# Patient Record
Sex: FEMALE | Race: ASIAN | NOT HISPANIC OR LATINO | URBAN - METROPOLITAN AREA
[De-identification: names, ages, dates, MRNs, and addresses within clinical notes are randomized per-mention and may not be internally consistent; named-entity substitution may affect disease eponyms.]

---

## 2017-12-18 ENCOUNTER — EMERGENCY (EMERGENCY)
Facility: HOSPITAL | Age: 37
LOS: 1 days | Discharge: HOME | End: 2017-12-18
Attending: EMERGENCY MEDICINE | Admitting: EMERGENCY MEDICINE
Payer: MEDICAID

## 2017-12-18 VITALS
OXYGEN SATURATION: 100 % | HEART RATE: 76 BPM | TEMPERATURE: 99 F | RESPIRATION RATE: 18 BRPM | SYSTOLIC BLOOD PRESSURE: 121 MMHG | DIASTOLIC BLOOD PRESSURE: 76 MMHG

## 2017-12-18 VITALS
HEART RATE: 88 BPM | SYSTOLIC BLOOD PRESSURE: 122 MMHG | RESPIRATION RATE: 18 BRPM | TEMPERATURE: 98 F | OXYGEN SATURATION: 99 % | DIASTOLIC BLOOD PRESSURE: 80 MMHG

## 2017-12-18 LAB
ALBUMIN SERPL ELPH-MCNC: 4.4 G/DL — SIGNIFICANT CHANGE UP (ref 3.3–5)
ALP SERPL-CCNC: 87 U/L — SIGNIFICANT CHANGE UP (ref 40–120)
ALT FLD-CCNC: 26 U/L RC — SIGNIFICANT CHANGE UP (ref 10–45)
ANION GAP SERPL CALC-SCNC: 14 MMOL/L — SIGNIFICANT CHANGE UP (ref 5–17)
APPEARANCE UR: ABNORMAL
APTT BLD: 35.3 SEC — SIGNIFICANT CHANGE UP (ref 27.5–37.4)
AST SERPL-CCNC: 18 U/L — SIGNIFICANT CHANGE UP (ref 10–40)
BACTERIA # UR AUTO: ABNORMAL /HPF
BASOPHILS # BLD AUTO: 0.1 K/UL — SIGNIFICANT CHANGE UP (ref 0–0.2)
BASOPHILS NFR BLD AUTO: 0.9 % — SIGNIFICANT CHANGE UP (ref 0–2)
BILIRUB SERPL-MCNC: 0.2 MG/DL — SIGNIFICANT CHANGE UP (ref 0.2–1.2)
BILIRUB UR-MCNC: NEGATIVE — SIGNIFICANT CHANGE UP
BLD GP AB SCN SERPL QL: NEGATIVE — SIGNIFICANT CHANGE UP
BUN SERPL-MCNC: 8 MG/DL — SIGNIFICANT CHANGE UP (ref 7–23)
CALCIUM SERPL-MCNC: 9 MG/DL — SIGNIFICANT CHANGE UP (ref 8.4–10.5)
CHLORIDE SERPL-SCNC: 103 MMOL/L — SIGNIFICANT CHANGE UP (ref 96–108)
CO2 SERPL-SCNC: 22 MMOL/L — SIGNIFICANT CHANGE UP (ref 22–31)
COLOR SPEC: YELLOW — SIGNIFICANT CHANGE UP
COMMENT - URINE: SIGNIFICANT CHANGE UP
CREAT SERPL-MCNC: 0.45 MG/DL — LOW (ref 0.5–1.3)
DIFF PNL FLD: ABNORMAL
EOSINOPHIL # BLD AUTO: 0.1 K/UL — SIGNIFICANT CHANGE UP (ref 0–0.5)
EOSINOPHIL NFR BLD AUTO: 0.8 % — SIGNIFICANT CHANGE UP (ref 0–6)
EPI CELLS # UR: SIGNIFICANT CHANGE UP /HPF
GLUCOSE SERPL-MCNC: 76 MG/DL — SIGNIFICANT CHANGE UP (ref 70–99)
GLUCOSE UR QL: NEGATIVE — SIGNIFICANT CHANGE UP
HCG SERPL-ACNC: 6041 MIU/ML — HIGH (ref 5–24)
HCT VFR BLD CALC: 43.3 % — SIGNIFICANT CHANGE UP (ref 34.5–45)
HGB BLD-MCNC: 14.7 G/DL — SIGNIFICANT CHANGE UP (ref 11.5–15.5)
INR BLD: 1.01 RATIO — SIGNIFICANT CHANGE UP (ref 0.88–1.16)
KETONES UR-MCNC: NEGATIVE — SIGNIFICANT CHANGE UP
LEUKOCYTE ESTERASE UR-ACNC: ABNORMAL
LYMPHOCYTES # BLD AUTO: 3.6 K/UL — HIGH (ref 1–3.3)
LYMPHOCYTES # BLD AUTO: 40.5 % — SIGNIFICANT CHANGE UP (ref 13–44)
MCHC RBC-ENTMCNC: 31.6 PG — SIGNIFICANT CHANGE UP (ref 27–34)
MCHC RBC-ENTMCNC: 34 GM/DL — SIGNIFICANT CHANGE UP (ref 32–36)
MCV RBC AUTO: 92.9 FL — SIGNIFICANT CHANGE UP (ref 80–100)
MONOCYTES # BLD AUTO: 0.5 K/UL — SIGNIFICANT CHANGE UP (ref 0–0.9)
MONOCYTES NFR BLD AUTO: 6.1 % — SIGNIFICANT CHANGE UP (ref 2–14)
NEUTROPHILS # BLD AUTO: 4.5 K/UL — SIGNIFICANT CHANGE UP (ref 1.8–7.4)
NEUTROPHILS NFR BLD AUTO: 51.7 % — SIGNIFICANT CHANGE UP (ref 43–77)
NITRITE UR-MCNC: NEGATIVE — SIGNIFICANT CHANGE UP
PH UR: 6.5 — SIGNIFICANT CHANGE UP (ref 5–8)
PLATELET # BLD AUTO: 246 K/UL — SIGNIFICANT CHANGE UP (ref 150–400)
POTASSIUM SERPL-MCNC: 4.1 MMOL/L — SIGNIFICANT CHANGE UP (ref 3.5–5.3)
POTASSIUM SERPL-SCNC: 4.1 MMOL/L — SIGNIFICANT CHANGE UP (ref 3.5–5.3)
PROT SERPL-MCNC: 8.1 G/DL — SIGNIFICANT CHANGE UP (ref 6–8.3)
PROT UR-MCNC: 30 MG/DL
PROTHROM AB SERPL-ACNC: 11 SEC — SIGNIFICANT CHANGE UP (ref 9.8–12.7)
RBC # BLD: 4.66 M/UL — SIGNIFICANT CHANGE UP (ref 3.8–5.2)
RBC # FLD: 11.5 % — SIGNIFICANT CHANGE UP (ref 10.3–14.5)
RBC CASTS # UR COMP ASSIST: >50 /HPF (ref 0–2)
RH IG SCN BLD-IMP: POSITIVE — SIGNIFICANT CHANGE UP
SODIUM SERPL-SCNC: 139 MMOL/L — SIGNIFICANT CHANGE UP (ref 135–145)
SP GR SPEC: 1.02 — SIGNIFICANT CHANGE UP (ref 1.01–1.02)
UROBILINOGEN FLD QL: NEGATIVE — SIGNIFICANT CHANGE UP
WBC # BLD: 8.8 K/UL — SIGNIFICANT CHANGE UP (ref 3.8–10.5)
WBC # FLD AUTO: 8.8 K/UL — SIGNIFICANT CHANGE UP (ref 3.8–10.5)
WBC UR QL: SIGNIFICANT CHANGE UP /HPF (ref 0–5)

## 2017-12-18 PROCEDURE — 86901 BLOOD TYPING SEROLOGIC RH(D): CPT

## 2017-12-18 PROCEDURE — 80053 COMPREHEN METABOLIC PANEL: CPT

## 2017-12-18 PROCEDURE — 76817 TRANSVAGINAL US OBSTETRIC: CPT

## 2017-12-18 PROCEDURE — 81001 URINALYSIS AUTO W/SCOPE: CPT

## 2017-12-18 PROCEDURE — 85027 COMPLETE CBC AUTOMATED: CPT

## 2017-12-18 PROCEDURE — 85610 PROTHROMBIN TIME: CPT

## 2017-12-18 PROCEDURE — 87086 URINE CULTURE/COLONY COUNT: CPT

## 2017-12-18 PROCEDURE — 99285 EMERGENCY DEPT VISIT HI MDM: CPT

## 2017-12-18 PROCEDURE — 85730 THROMBOPLASTIN TIME PARTIAL: CPT

## 2017-12-18 PROCEDURE — 99284 EMERGENCY DEPT VISIT MOD MDM: CPT | Mod: 25

## 2017-12-18 PROCEDURE — 86850 RBC ANTIBODY SCREEN: CPT

## 2017-12-18 PROCEDURE — 76817 TRANSVAGINAL US OBSTETRIC: CPT | Mod: 26

## 2017-12-18 PROCEDURE — 84702 CHORIONIC GONADOTROPIN TEST: CPT

## 2017-12-18 PROCEDURE — 86900 BLOOD TYPING SEROLOGIC ABO: CPT

## 2017-12-18 NOTE — ED PROVIDER NOTE - PLAN OF CARE
Follow-up with your OBGYN within 48 hours for repeat evaluation. Return to an ER for severe bleeding, abdominal pain, fainting or any other concerns.

## 2017-12-18 NOTE — ED PROVIDER NOTE - OBJECTIVE STATEMENT
37 y.o. female  at approx 7 weeks by dates, LMP oct 27, pw lower ab cramping and vaginal spotting x3 days, today slightly worse, 1 pad change today. Pain has subsided at this time. Pt has not had US or appointment with OB for this pregnancy yet. On prenatal vitamin but no other meds. No complications with 1st pregnancy.

## 2017-12-18 NOTE — ED PROVIDER NOTE - CARE PLAN
Principal Discharge DX:	Vaginal bleeding in pregnancy, first trimester  Instructions for follow-up, activity and diet:	Follow-up with your OBGYN within 48 hours for repeat evaluation. Return to an ER for severe bleeding, abdominal pain, fainting or any other concerns.

## 2017-12-18 NOTE — ED ADULT NURSE NOTE - OBJECTIVE STATEMENT
38 y/o female a+ox3, denies pmhx, , ambulatory from home c/o vaginal bleeding. Pt reports constant vaginal bleeding starting this morning; blood is bright red with "small clots", Pt states she has intermittent LLQ cramping, non-radiaitng, currently denies pain. Pt confirms she is "7 weeks pregnant" LMP 18, denies diagnosis of high risk pregnancy, states prior pregnancy was normal. Pt denies visual changes, headache, feeling lightheaded, dizziness, chest pain or discomfort, difficulty breathing, N/V/D, fever or chills. VS documented, IV established, labs drawn. Pt left in position of comfort, will reassess.

## 2017-12-18 NOTE — ED PROVIDER NOTE - ATTENDING CONTRIBUTION TO CARE
attending Julio: 37yF  at approx 7 weeks by LMP (oct 27) p/w lower abdominal cramping and vaginal spotting x3 days. No confirmed IUP. No prior miscarriage. On exam, VSS, well-appearing, pink conjunctiva, abdomen soft/NT. Will obtain labs including serum hcg, type and screen, urinalysis, TVUS eval for IUP and reassess.

## 2017-12-18 NOTE — ED PROVIDER NOTE - NS ED ROS FT
ROS: denies HA, weakness, dizziness, fevers/chills, nausea/vomiting, chest pain, SOB, diaphoresis, back/neck pain, dysuria/hematuria, or rash  +ab pain, vaginal spotting

## 2017-12-18 NOTE — ED ADULT NURSE REASSESSMENT NOTE - NS ED NURSE REASSESS COMMENT FT1
Patient ate egg sandwich and tolerated well.  Patient denies nausea.
Patient received from JERE Sanchez, denies nausea and pain at this time, requests something to eat.  Awaiting US.

## 2017-12-18 NOTE — ED PROVIDER NOTE - MEDICAL DECISION MAKING DETAILS
37 y.o. female  at 7 weeks by dates pw ab cramping and vaginal spotting x 3 days. Well appearing. Will check labs, US, reevaluate.

## 2017-12-19 ENCOUNTER — APPOINTMENT (OUTPATIENT)
Dept: OBGYN | Facility: CLINIC | Age: 37
End: 2017-12-19
Payer: MEDICAID

## 2017-12-19 ENCOUNTER — OUTPATIENT (OUTPATIENT)
Dept: OUTPATIENT SERVICES | Facility: HOSPITAL | Age: 37
LOS: 1 days | End: 2017-12-19
Payer: MEDICAID

## 2017-12-19 VITALS — BODY MASS INDEX: 29.64 KG/M2 | WEIGHT: 170 LBS | DIASTOLIC BLOOD PRESSURE: 70 MMHG | SYSTOLIC BLOOD PRESSURE: 112 MMHG

## 2017-12-19 DIAGNOSIS — N76.0 ACUTE VAGINITIS: ICD-10-CM

## 2017-12-19 DIAGNOSIS — N91.2 AMENORRHEA, UNSPECIFIED: ICD-10-CM

## 2017-12-19 DIAGNOSIS — Z3A.01 LESS THAN 8 WEEKS GESTATION OF PREGNANCY: ICD-10-CM

## 2017-12-19 LAB
CULTURE RESULTS: SIGNIFICANT CHANGE UP
SPECIMEN SOURCE: SIGNIFICANT CHANGE UP

## 2017-12-19 PROCEDURE — 99213 OFFICE O/P EST LOW 20 MIN: CPT | Mod: GE

## 2017-12-20 ENCOUNTER — LABORATORY RESULT (OUTPATIENT)
Age: 37
End: 2017-12-20

## 2017-12-20 PROCEDURE — 84702 CHORIONIC GONADOTROPIN TEST: CPT

## 2017-12-20 PROCEDURE — G0463: CPT

## 2017-12-21 LAB — HCG SERPL-ACNC: 5008 MIU/ML — SIGNIFICANT CHANGE UP

## 2017-12-22 ENCOUNTER — ASOB RESULT (OUTPATIENT)
Age: 37
End: 2017-12-22

## 2017-12-22 ENCOUNTER — APPOINTMENT (OUTPATIENT)
Dept: ANTEPARTUM | Facility: CLINIC | Age: 37
End: 2017-12-22
Payer: MEDICAID

## 2017-12-22 ENCOUNTER — EMERGENCY (EMERGENCY)
Facility: HOSPITAL | Age: 37
LOS: 1 days | Discharge: HOME | End: 2017-12-22
Attending: EMERGENCY MEDICINE | Admitting: EMERGENCY MEDICINE
Payer: MEDICAID

## 2017-12-22 VITALS
HEART RATE: 82 BPM | WEIGHT: 166.89 LBS | TEMPERATURE: 99 F | RESPIRATION RATE: 20 BRPM | OXYGEN SATURATION: 100 % | SYSTOLIC BLOOD PRESSURE: 135 MMHG | DIASTOLIC BLOOD PRESSURE: 83 MMHG

## 2017-12-22 DIAGNOSIS — O03.9 COMPLETE OR UNSPECIFIED SPONTANEOUS ABORTION WITHOUT COMPLICATION: ICD-10-CM

## 2017-12-22 LAB
ANION GAP SERPL CALC-SCNC: 14 MMOL/L — SIGNIFICANT CHANGE UP (ref 5–17)
BASOPHILS # BLD AUTO: 0.1 K/UL — SIGNIFICANT CHANGE UP (ref 0–0.2)
BASOPHILS NFR BLD AUTO: 0.7 % — SIGNIFICANT CHANGE UP (ref 0–2)
BUN SERPL-MCNC: 11 MG/DL — SIGNIFICANT CHANGE UP (ref 7–23)
CALCIUM SERPL-MCNC: 9.4 MG/DL — SIGNIFICANT CHANGE UP (ref 8.4–10.5)
CHLORIDE SERPL-SCNC: 101 MMOL/L — SIGNIFICANT CHANGE UP (ref 96–108)
CO2 SERPL-SCNC: 22 MMOL/L — SIGNIFICANT CHANGE UP (ref 22–31)
CREAT SERPL-MCNC: 0.49 MG/DL — LOW (ref 0.5–1.3)
EOSINOPHIL # BLD AUTO: 0.1 K/UL — SIGNIFICANT CHANGE UP (ref 0–0.5)
EOSINOPHIL NFR BLD AUTO: 0.6 % — SIGNIFICANT CHANGE UP (ref 0–6)
GLUCOSE SERPL-MCNC: 73 MG/DL — SIGNIFICANT CHANGE UP (ref 70–99)
HCG SERPL-ACNC: 5446 MIU/ML — HIGH (ref 5–24)
HCT VFR BLD CALC: 45.4 % — HIGH (ref 34.5–45)
HGB BLD-MCNC: 15.3 G/DL — SIGNIFICANT CHANGE UP (ref 11.5–15.5)
LYMPHOCYTES # BLD AUTO: 3.6 K/UL — HIGH (ref 1–3.3)
LYMPHOCYTES # BLD AUTO: 39.4 % — SIGNIFICANT CHANGE UP (ref 13–44)
MCHC RBC-ENTMCNC: 31.3 PG — SIGNIFICANT CHANGE UP (ref 27–34)
MCHC RBC-ENTMCNC: 33.7 GM/DL — SIGNIFICANT CHANGE UP (ref 32–36)
MCV RBC AUTO: 93.1 FL — SIGNIFICANT CHANGE UP (ref 80–100)
MONOCYTES # BLD AUTO: 0.7 K/UL — SIGNIFICANT CHANGE UP (ref 0–0.9)
MONOCYTES NFR BLD AUTO: 7.2 % — SIGNIFICANT CHANGE UP (ref 2–14)
NEUTROPHILS # BLD AUTO: 4.7 K/UL — SIGNIFICANT CHANGE UP (ref 1.8–7.4)
NEUTROPHILS NFR BLD AUTO: 52 % — SIGNIFICANT CHANGE UP (ref 43–77)
PLATELET # BLD AUTO: 254 K/UL — SIGNIFICANT CHANGE UP (ref 150–400)
POTASSIUM SERPL-MCNC: 3.7 MMOL/L — SIGNIFICANT CHANGE UP (ref 3.5–5.3)
POTASSIUM SERPL-SCNC: 3.7 MMOL/L — SIGNIFICANT CHANGE UP (ref 3.5–5.3)
RBC # BLD: 4.88 M/UL — SIGNIFICANT CHANGE UP (ref 3.8–5.2)
RBC # FLD: 11.4 % — SIGNIFICANT CHANGE UP (ref 10.3–14.5)
SODIUM SERPL-SCNC: 137 MMOL/L — SIGNIFICANT CHANGE UP (ref 135–145)
WBC # BLD: 9.1 K/UL — SIGNIFICANT CHANGE UP (ref 3.8–10.5)
WBC # FLD AUTO: 9.1 K/UL — SIGNIFICANT CHANGE UP (ref 3.8–10.5)

## 2017-12-22 PROCEDURE — 80048 BASIC METABOLIC PNL TOTAL CA: CPT

## 2017-12-22 PROCEDURE — 76830 TRANSVAGINAL US NON-OB: CPT

## 2017-12-22 PROCEDURE — 99285 EMERGENCY DEPT VISIT HI MDM: CPT

## 2017-12-22 PROCEDURE — 99284 EMERGENCY DEPT VISIT MOD MDM: CPT | Mod: 25

## 2017-12-22 PROCEDURE — 76817 TRANSVAGINAL US OBSTETRIC: CPT

## 2017-12-22 PROCEDURE — 76817 TRANSVAGINAL US OBSTETRIC: CPT | Mod: 26

## 2017-12-22 PROCEDURE — 84702 CHORIONIC GONADOTROPIN TEST: CPT

## 2017-12-22 PROCEDURE — 76801 OB US < 14 WKS SINGLE FETUS: CPT

## 2017-12-22 PROCEDURE — 85027 COMPLETE CBC AUTOMATED: CPT

## 2017-12-22 NOTE — ED PROVIDER NOTE - PLAN OF CARE
stable You were in the ED for vaginal bleeding. Labs and imaging show that you are having a miscarriage. It is important that you follow up with the OB/GYN clinic next week. However, if you have worsening bleeding resulting in lightheadedness, nausea, vomiting, fevers, chills, please come to the emergency department immediately

## 2017-12-22 NOTE — CONSULT NOTE ADULT - ASSESSMENT
38yo  at 8wks GA by LMP 10/27/17 with vaginal bleeding, with findings c/w inevitable       Patient is hemodynamically stable, no active bleeding on exam, stable hematocrit all reassuring. No abdominal tenderness and no imaging findings suspicious for ectopic pregnancy. Discussed managment options including medication vs surgical vs expectant.  This is a highly desired pregnancy, and patient prefers expectant management. This is feasible as she is currently stable without clinical signs of infection, anemia, or other alarm symptoms

## 2017-12-22 NOTE — ED PROVIDER NOTE - PROGRESS NOTE DETAILS
Attd:  Received sign out on patient - early pregnancy with persisting spotting, found to have decreasing bHCG as outpatient and initial US without identified fetal pole, pending repeat TVUS to re-evaluate and OBGYN consultation with plan to disposition based off imaging and OB recommendations.  Viral Solis M.D. Called by radiology for signs of  in process. OB/GYN called, will call us back with recs

## 2017-12-22 NOTE — ED PROVIDER NOTE - OBJECTIVE STATEMENT
32yo Female with no pmhx presenting with vaginal bleeding. Patient presented to ED earlier this week with vaginal bleeding. Her LMP was Oct 27 and usually last three days. She has been having 5 days of vaginal bleeding, 1 pad per day. She was at an outpatient US clinic and was recommended to come to the ED. No nausea, vomiting. Has some lower abdominal bloating    OB/GYN resident was spoken to, they will follow

## 2017-12-22 NOTE — ED PROVIDER NOTE - ATTENDING CONTRIBUTION TO CARE
30yo Female with no pmhx presenting with vaginal bleeding. Patient presented to ED earlier this week with vaginal bleeding. with decreased bhcg likely missed ab, pelvic, and us pending likely fetal demise.  ob/gyn.

## 2017-12-22 NOTE — ED ADULT NURSE NOTE - OBJECTIVE STATEMENT
37 yr old female came in with vag bleed , she is aprox 8 wks preg. she started spotting but now became a period. she went to the clinic and they said they couldn't see anything on the ultrasound and to go into the ER. on assessment a and o x 3 lungs clear abd soft non tender no swelling in extremites no n/v/d/ no fevers. pt had a normal pregncay and delivery 13 months ago. only been pregnant 2 times. no other medical problems.

## 2017-12-22 NOTE — ED PROVIDER NOTE - CARE PLAN
Principal Discharge DX:	Miscarriage  Goal:	stable  Instructions for follow-up, activity and diet:	You were in the ED for vaginal bleeding. Labs and imaging show that you are having a miscarriage. It is important that you follow up with the OB/GYN clinic next week. However, if you have worsening bleeding resulting in lightheadedness, nausea, vomiting, fevers, chills, please come to the emergency department immediately

## 2017-12-22 NOTE — CONSULT NOTE ADULT - SUBJECTIVE AND OBJECTIVE BOX
R2 GYN Consult Note    36yo  at 8wks GA by LMP 10/27/17 with vaginal bleeding x5days sent in from outpatient sono for pregnancy of unknown location. Patient initially presented 4 days ago with vaginal bleeding, sono showed intrauterine gestation c/w 5w1d and patient was discharged with outpatient follow up. Patient [facility] and today, had an ultrasound which did not show a gestational sac.   Today, she denies fevers, chills, nausea, vomiting, abdominal cramping, dysuria. She reports ongoing vaginal bleeding x 5days, using 2 pads/day. No abnormal discharge otherwise.    bHCG ()7074 --> () 7224 --> () 6039     OBHx:  x3  GynHx: reg menses  PMSH: denies  NKDA  Meds: none  SocialHx: denies smoking, alcohol, or other drugs    Vital Signs Last 24 Hrs  T(C): 37.1 (22 Dec 2017 12:17), Max: 37.1 (22 Dec 2017 12:17)  T(F): 98.8 (22 Dec 2017 12:17), Max: 98.8 (22 Dec 2017 12:17)  HR: 82 (22 Dec 2017 12:17) (82 - 82)  BP: 135/83 (22 Dec 2017 12:17) (135/83 - 135/83)  RR: 20 (22 Dec 2017 12:17) (20 - 20)  SpO2: 100% (22 Dec 2017 12:17) (100% - 100%)    PE:  Gen: Comfortable, NAD  CV: RRR, no murmurs  Pulm: CTAB  Abd: soft, nontender, nondistended, no rebound or guarding  Ext: No edema or tenderness bilaterally  Spec Exam: no active bleeding from os, no gross lesions, ~5cc dark blood cleaned from vault  Bimanual: anteverted uterus nontender, external os dilated to 1cm, internal closed, no CMT, no palpable adnexal mass or tenderness bilaterally    LABS:    Rh positive                     15.3   9.1   )-----------( 254      ( 22 Dec 2017 14:30 )             45.4         137  |  101  |  11  ----------------------------<  73  3.7   |  22  |  0.49<L>    Ca    9.4      22 Dec 2017 14:30            RADIOLOGY & ADDITIONAL STUDIES:  < from: US Echo Transvaginal, OB (17 @ 15:59) >  Uterus: 7.7 x 3.3 x 5.2 cm. Echogenic material is noted in the   endocervical canal, likely representing blood.    Gestational Sac Size (mean): Mean sac diameter of 1.4 cm, corresponding   to 5 weeks 4 day gestation. The sac is elongated in appearance.    Crown Rump Length: 0.3 cm, corresponding to a 6 week gestation. No fetal   cardiac activity is seen.    Yolk Sac: Irregular appearing yolk sac.    Right ovary: 2.2 x 1.4 x 1.9 cm. Within normal limits.    Left ovary: 2.6 x 1.7 x 2.5 cm. Within normal limits.    Fluid: None.    IMPRESSION:    Elongated gestational sac. A fetal pole is seen. However, no fetal heart   is identified. The yolk sac is irregular. Echogenic material in the   endocervical canal likely represents blood. Findings are suspicious for   an  in progress.      < from: US Echo Transvaginal, OB (17 @ 20:34) >  Single intrauterine gestational sac with mean sac diameter corresponding   to a gestational age of 5 weeks 1 day. No fetal pole is identified, which   may be secondary to early gestational age at time of imaging.

## 2017-12-28 DIAGNOSIS — Z34.90 ENCOUNTER FOR SUPERVISION OF NORMAL PREGNANCY, UNSPECIFIED, UNSPECIFIED TRIMESTER: ICD-10-CM

## 2017-12-28 DIAGNOSIS — N91.2 AMENORRHEA, UNSPECIFIED: ICD-10-CM

## 2017-12-28 DIAGNOSIS — O20.0 THREATENED ABORTION: ICD-10-CM

## 2018-01-02 ENCOUNTER — OUTPATIENT (OUTPATIENT)
Dept: OUTPATIENT SERVICES | Facility: HOSPITAL | Age: 38
LOS: 1 days | End: 2018-01-02
Payer: MEDICAID

## 2018-01-02 ENCOUNTER — LABORATORY RESULT (OUTPATIENT)
Age: 38
End: 2018-01-02

## 2018-01-02 ENCOUNTER — APPOINTMENT (OUTPATIENT)
Dept: OBGYN | Facility: CLINIC | Age: 38
End: 2018-01-02
Payer: MEDICAID

## 2018-01-02 VITALS — SYSTOLIC BLOOD PRESSURE: 106 MMHG | BODY MASS INDEX: 29.29 KG/M2 | DIASTOLIC BLOOD PRESSURE: 70 MMHG | WEIGHT: 168 LBS

## 2018-01-02 DIAGNOSIS — N76.0 ACUTE VAGINITIS: ICD-10-CM

## 2018-01-02 DIAGNOSIS — O03.9 COMPLETE OR UNSPECIFIED SPONTANEOUS ABORTION W/OUT COMPLICATION: ICD-10-CM

## 2018-01-02 LAB
HCT VFR BLD CALC: 43 % — SIGNIFICANT CHANGE UP (ref 34.5–45)
HGB BLD-MCNC: 14 G/DL — SIGNIFICANT CHANGE UP (ref 11.5–15.5)
MCHC RBC-ENTMCNC: 30 PG — SIGNIFICANT CHANGE UP (ref 27–34)
MCHC RBC-ENTMCNC: 32.6 GM/DL — SIGNIFICANT CHANGE UP (ref 32–36)
MCV RBC AUTO: 92.3 FL — SIGNIFICANT CHANGE UP (ref 80–100)
PLATELET # BLD AUTO: 278 K/UL — SIGNIFICANT CHANGE UP (ref 150–400)
RBC # BLD: 4.66 M/UL — SIGNIFICANT CHANGE UP (ref 3.8–5.2)
RBC # FLD: 13.3 % — SIGNIFICANT CHANGE UP (ref 10.3–14.5)
WBC # BLD: 5.89 K/UL — SIGNIFICANT CHANGE UP (ref 3.8–10.5)
WBC # FLD AUTO: 5.89 K/UL — SIGNIFICANT CHANGE UP (ref 3.8–10.5)

## 2018-01-02 PROCEDURE — 76830 TRANSVAGINAL US NON-OB: CPT

## 2018-01-02 PROCEDURE — 85027 COMPLETE CBC AUTOMATED: CPT

## 2018-01-02 PROCEDURE — G0463: CPT

## 2018-01-02 PROCEDURE — 76830 TRANSVAGINAL US NON-OB: CPT | Mod: 26,NC

## 2018-01-02 PROCEDURE — 36415 COLL VENOUS BLD VENIPUNCTURE: CPT | Mod: NC

## 2018-01-02 PROCEDURE — 84702 CHORIONIC GONADOTROPIN TEST: CPT

## 2018-01-02 PROCEDURE — 99214 OFFICE O/P EST MOD 30 MIN: CPT | Mod: 25,NC

## 2018-01-03 LAB — HCG SERPL-ACNC: 207 MIU/ML — SIGNIFICANT CHANGE UP

## 2018-01-11 DIAGNOSIS — O03.9 COMPLETE OR UNSPECIFIED SPONTANEOUS ABORTION WITHOUT COMPLICATION: ICD-10-CM

## 2018-02-07 ENCOUNTER — APPOINTMENT (OUTPATIENT)
Dept: OBGYN | Facility: CLINIC | Age: 38
End: 2018-02-07

## 2018-05-01 ENCOUNTER — OUTPATIENT (OUTPATIENT)
Dept: OUTPATIENT SERVICES | Facility: HOSPITAL | Age: 38
LOS: 1 days | End: 2018-05-01
Payer: MEDICAID

## 2018-05-01 PROCEDURE — G9001: CPT

## 2018-05-11 ENCOUNTER — EMERGENCY (EMERGENCY)
Facility: HOSPITAL | Age: 38
LOS: 1 days | End: 2018-05-11

## 2018-05-11 VITALS
OXYGEN SATURATION: 100 % | SYSTOLIC BLOOD PRESSURE: 99 MMHG | RESPIRATION RATE: 18 BRPM | DIASTOLIC BLOOD PRESSURE: 69 MMHG | HEART RATE: 69 BPM | TEMPERATURE: 98 F

## 2018-05-16 DIAGNOSIS — R69 ILLNESS, UNSPECIFIED: ICD-10-CM

## 2018-06-13 ENCOUNTER — APPOINTMENT (OUTPATIENT)
Dept: OBGYN | Facility: CLINIC | Age: 38
End: 2018-06-13

## 2019-04-10 ENCOUNTER — APPOINTMENT (OUTPATIENT)
Dept: OBGYN | Facility: CLINIC | Age: 39
End: 2019-04-10

## 2019-08-05 NOTE — ED ADULT NURSE NOTE - CCCP TRG CHIEF CMPLNT
Nyla oRdriguez is a 35 year old female here for a routine eye exam.   There is a history of   1. Myopia with astigmatism, bilateral    2. Optic pit, left    3.  Full binocularity and accommodation.  4.  Eye health otherwise within normal limits.    Last pupil dilation:  1 year.  She does  need more contact lenses.   She reports no specific visual concerns.  She wears her glasses mainly in the evenings & sometimes on the weekends, & they are working well for her as well.       I have reviewed the patient's medications and allergies, past medical, surgical, social and family history, updating these as appropriate.  See Histories section of the EMR for a display of this information.        ASSESSMENT:  1. Myopia with astigmatism, bilateral    2. Optic pit, left    3.  Full binocularity and accommodation.  4.  Acceptable contact lens fit & visual acuity.  5.  Eye health otherwise within normal limits.      PLAN:  1.  No change in glasses needed.  Updated CL RX with tiny axis change OS.  Remove lenses nightly; replace every 4 weeks.  Remove lenses and call the office if eyes ever become red, sore or vision seems blurry.  Discussed how eyes should always look good, feel good and vision should be clear.  3.  Return for next routine eye exam and contact lens evaluation in 1 year or sooner as needed.  Pupil dilation needed next time.      Hilda Hernandez, OD     abdominal pain
